# Patient Record
Sex: MALE | URBAN - METROPOLITAN AREA
[De-identification: names, ages, dates, MRNs, and addresses within clinical notes are randomized per-mention and may not be internally consistent; named-entity substitution may affect disease eponyms.]

---

## 2018-10-05 ENCOUNTER — OFFICE VISIT (OUTPATIENT)
Dept: URBAN - METROPOLITAN AREA CLINIC 29 | Facility: CLINIC | Age: 73
End: 2018-10-05
Payer: COMMERCIAL

## 2018-10-05 PROCEDURE — 99213 OFFICE O/P EST LOW 20 MIN: CPT | Performed by: OPHTHALMOLOGY

## 2018-10-05 ASSESSMENT — INTRAOCULAR PRESSURE
OS: 16
OD: 18

## 2018-10-05 NOTE — IMPRESSION/PLAN
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 Trab OS- IOP doing well ou -
CCT average OU no need to adjust measurements. -Enlarged cupping OU-
S/P ALT OS 2/8/18 Plan: Discussed diagnosis, explained and understood by patient. Discussed IOP/ONH/Glaucoma management and risks. Continue latanoprost qhs ou, dorzolamide-timolol bid ou, and brimonidine bid ou. Will continue to monitor condition and symptoms. Patient going home for the summer to [place]
and instructed patient to see MD in 4 mo for IOP check.

## 2019-02-08 ENCOUNTER — OFFICE VISIT (OUTPATIENT)
Dept: URBAN - METROPOLITAN AREA CLINIC 29 | Facility: CLINIC | Age: 74
End: 2019-02-08
Payer: COMMERCIAL

## 2019-02-08 DIAGNOSIS — H40.1411 CAPSLR GLAUCOMA W/PSEUDXF LENS, RIGHT EYE, MILD STAGE: Primary | ICD-10-CM

## 2019-02-08 DIAGNOSIS — H40.1423 CAPSLR GLAUCOMA W/PSEUDXF LENS, LEFT EYE, SEVERE STAGE: ICD-10-CM

## 2019-02-08 PROCEDURE — 99214 OFFICE O/P EST MOD 30 MIN: CPT | Performed by: OPHTHALMOLOGY

## 2019-02-08 PROCEDURE — 92083 EXTENDED VISUAL FIELD XM: CPT | Performed by: OPHTHALMOLOGY

## 2019-02-08 PROCEDURE — 92133 CPTRZD OPH DX IMG PST SGM ON: CPT | Performed by: OPHTHALMOLOGY

## 2019-02-08 RX ORDER — LATANOPROST 50 UG/ML
0.005 % SOLUTION OPHTHALMIC
Qty: 3 | Refills: 4 | Status: ACTIVE
Start: 2019-02-08

## 2019-02-08 RX ORDER — DORZOLAMIDE HYDROCHLORIDE AND TIMOLOL MALEATE 20; 5 MG/ML; MG/ML
SOLUTION/ DROPS OPHTHALMIC
Qty: 3 | Refills: 4 | Status: ACTIVE
Start: 2019-02-08

## 2019-02-08 ASSESSMENT — INTRAOCULAR PRESSURE
OD: 14
OS: 16

## 2019-02-08 NOTE — IMPRESSION/PLAN
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 Trabeculectomy OS- IOP doing well ou -
CCT average OU - ONH stable ou - S/P ALT OS 2/8/18 Plan: Discussed diagnosis, explained and understood by patient. Discussed IOP/ONH/Glaucoma management and risks. OCT and VF ordered and reviewed with patient. Continue latanoprost qhs ou, dorzolamide-timolol bid ou, and brimonidine bid ou. Will continue to monitor condition and symptoms. Patient going home for the summer and instructed patient to see MD in 4 mo for IOP check.